# Patient Record
Sex: FEMALE | URBAN - METROPOLITAN AREA
[De-identification: names, ages, dates, MRNs, and addresses within clinical notes are randomized per-mention and may not be internally consistent; named-entity substitution may affect disease eponyms.]

---

## 2021-02-17 ENCOUNTER — OFFICE VISIT (OUTPATIENT)
Dept: URBAN - METROPOLITAN AREA CLINIC 75 | Facility: CLINIC | Age: 76
End: 2021-02-17
Payer: COMMERCIAL

## 2021-02-17 DIAGNOSIS — H52.223 REGULAR ASTIGMATISM, BILATERAL: Primary | ICD-10-CM

## 2021-02-17 DIAGNOSIS — H25.813 COMBINED FORMS OF AGE-RELATED CATARACT, BILATERAL: ICD-10-CM

## 2021-02-17 PROCEDURE — 92014 COMPRE OPH EXAM EST PT 1/>: CPT | Performed by: OPTOMETRIST

## 2021-02-17 ASSESSMENT — INTRAOCULAR PRESSURE
OD: 18
OS: 19

## 2021-02-17 ASSESSMENT — KERATOMETRY
OD: 46.88
OS: 46.88

## 2021-02-17 ASSESSMENT — VISUAL ACUITY
OS: 20/30
OD: 20/40

## 2021-02-17 NOTE — IMPRESSION/PLAN
Impression: Dermatochalasis of left upper eyelid: H02.834.  Plan: Recommend pt see Dr. Soniya Kumar for bleph eval

## 2021-02-17 NOTE — IMPRESSION/PLAN
Impression: Combined forms of age-related cataract, bilateral: H25.813. Plan: Discussed cataracts do not require treatment unless they interfere with vision and impact one's activities of daily living, in which case cataract extraction with lens implant insertion should be performed. Some specific cataracts like posterior subcapsular cataracts occur more commonly in diabetics, patients taking longterm steroid medications, and following trauma. The appropriate time to perform cataract surgery is when the loss of vision is interfering with your activities of daily living and a change in eyeglass prescription won't help. Pt declines any difficulty w/ vision at this time and elects to observe for now. No sx recommended. Discussed signs and symptoms of cataract progression. Pt to contact office if they experience progressive loss of vision, increasing glare, and problems with activities of daily living such as driving, reading, watching TV, seeing street signs, and following the golf ball.

## 2021-02-17 NOTE — IMPRESSION/PLAN
Impression: Dermatochalasis of right upper eyelid: H02.831.  Plan: Recommend pt see Dr. Bart Kinney for bleph eval

## 2021-02-17 NOTE — IMPRESSION/PLAN
Impression: Vitreous degeneration, bilateral: H43.813. Plan: Posterior vitreous detachments are a normal aging change due to the vitreous jelly pulling away from the retinal lining of the eye. They may accompany retinal tears, retinal holes, or retinal detachments, so a dilated retinal examination is important. PVDs will usually diminish with time, but it may take several months and they rarely disappear entirely. Pt to contact office if symptoms worsen, including an increase in number of floaters, you experience flashing lights, loss of vision, or a black curtain blocking your field of vision.

## 2021-03-24 ENCOUNTER — OFFICE VISIT (OUTPATIENT)
Dept: URBAN - METROPOLITAN AREA CLINIC 71 | Facility: CLINIC | Age: 76
End: 2021-03-24
Payer: MEDICARE

## 2021-03-24 DIAGNOSIS — H02.831 DERMATOCHALASIS OF RIGHT UPPER EYELID: Primary | ICD-10-CM

## 2021-03-24 DIAGNOSIS — Z41.1 ENCOUNTER FOR COSMETIC SURGERY: ICD-10-CM

## 2021-03-24 DIAGNOSIS — H02.834 DERMATOCHALASIS OF LEFT UPPER EYELID: ICD-10-CM

## 2021-03-24 PROCEDURE — 99204 OFFICE O/P NEW MOD 45 MIN: CPT | Performed by: OPHTHALMOLOGY

## 2021-03-24 PROCEDURE — 92285 EXTERNAL OCULAR PHOTOGRAPHY: CPT | Performed by: OPHTHALMOLOGY

## 2021-03-24 PROCEDURE — 92081 LIMITED VISUAL FIELD XM: CPT | Performed by: OPHTHALMOLOGY

## 2021-03-24 RX ORDER — NEOMYCIN SULFATE, POLYMYXIN B SULFATE AND DEXAMETHASONE 3.5; 10000; 1 MG/G; [USP'U]/G; MG/G
OINTMENT OPHTHALMIC
Qty: 2 | Refills: 1 | Status: INACTIVE
Start: 2021-03-24 | End: 2021-05-24

## 2021-03-24 NOTE — IMPRESSION/PLAN
Impression: Dermatochalasis of right upper eyelid: H02.831. Plan: Visual Fields & photos were performed and interpreted today and demonstrated restriction of superior and temporal visual fields. This reverted to normal, demonstrating >30% improvement in visual field with mechanical elevation of the eyelid and brow. To correct the loss of superior and temporal VF caused by redundant upper eyelid skin and muscle, I recommended performing an upper lid blepharoplasty. Discussed the R/B/A of this procedure, all questions were answered.

## 2021-03-24 NOTE — IMPRESSION/PLAN
Impression: Dermatochalasis of left upper eyelid: H02.834. Plan: Visual Fields & photos were performed and interpreted today and demonstrated restriction of superior and temporal visual fields. This reverted to normal, demonstrating >30% improvement in visual field with mechanical elevation of the eyelid and brow. To correct the loss of superior and temporal VF caused by redundant upper eyelid skin and muscle, I recommended performing an upper lid blepharoplasty. Discussed the R/B/A of this procedure, all questions were answered.

## 2021-03-24 NOTE — IMPRESSION/PLAN
Impression: Encounter for cosmetic surgery: Z41.1. Plan: pt demonstrates eyebrow ptosis which contributes to upper eyelid hooding and we discussed surgical options including endobrow, direct brow, pretrichial brow lift, and internal brow lifting techniques. After review of these options, we selected cosmetic pretrichial brow lift as the best option given the patient's anatomy, desires, and means. We discussed the r/b/a of this procedure. Discussed risks of temporal nerve branch damage as well as hair loss in the area.

## 2021-03-31 ENCOUNTER — OFFICE VISIT (OUTPATIENT)
Dept: URBAN - METROPOLITAN AREA CLINIC 75 | Facility: CLINIC | Age: 76
End: 2021-03-31

## 2021-03-31 PROCEDURE — 92015 DETERMINE REFRACTIVE STATE: CPT | Performed by: OPTOMETRIST

## 2021-03-31 ASSESSMENT — VISUAL ACUITY
OS: 20/30
OD: 20/30

## 2021-03-31 NOTE — IMPRESSION/PLAN
Impression: Regular astigmatism, bilateral: H52.223. MRx recheck. Axis may be the culprit OD. Plan: A glasses Rx was dispensed today. Pt to call with any concerns.

## 2021-04-28 ENCOUNTER — SURGERY (OUTPATIENT)
Dept: URBAN - METROPOLITAN AREA SURGERY 44 | Facility: SURGERY | Age: 76
End: 2021-04-28
Payer: MEDICARE

## 2021-04-28 ENCOUNTER — SURGERY (OUTPATIENT)
Dept: URBAN - METROPOLITAN AREA SURGERY 45 | Facility: SURGERY | Age: 76
End: 2021-04-28
Payer: COMMERCIAL

## 2021-05-07 ENCOUNTER — POST-OPERATIVE VISIT (OUTPATIENT)
Dept: URBAN - METROPOLITAN AREA CLINIC 72 | Facility: CLINIC | Age: 76
End: 2021-05-07
Payer: MEDICARE

## 2021-05-07 DIAGNOSIS — Z48.89 ENCOUNTER FOR OTHER SPECIFIED SURGICAL AFTERCARE: Primary | ICD-10-CM

## 2021-05-07 PROCEDURE — 99024 POSTOP FOLLOW-UP VISIT: CPT | Performed by: OPTOMETRIST

## 2021-05-07 ASSESSMENT — INTRAOCULAR PRESSURE
OD: 13
OS: 14

## 2021-05-07 NOTE — IMPRESSION/PLAN
Impression: S/P Both Upper Eyelid Blepharoplasty OU - 9 Days. Encounter for other specified surgical aftercare  Z48.89. Plan: Sutures removed in clinic today, without complications. Pt can D/c aga or use for itching/comfort --Advised patient to use artificial tears for comfort.

## 2021-05-24 ENCOUNTER — OFFICE VISIT (OUTPATIENT)
Dept: URBAN - METROPOLITAN AREA CLINIC 75 | Facility: CLINIC | Age: 76
End: 2021-05-24
Payer: MEDICARE

## 2021-05-24 DIAGNOSIS — H43.813 VITREOUS DEGENERATION, BILATERAL: ICD-10-CM

## 2021-05-24 PROCEDURE — 99214 OFFICE O/P EST MOD 30 MIN: CPT | Performed by: OPTOMETRIST

## 2021-05-24 PROCEDURE — 92134 CPTRZ OPH DX IMG PST SGM RTA: CPT | Performed by: OPTOMETRIST

## 2021-05-24 ASSESSMENT — KERATOMETRY
OD: 47.50
OS: 47.25

## 2021-05-24 ASSESSMENT — INTRAOCULAR PRESSURE
OS: 14
OD: 13

## 2021-11-23 ENCOUNTER — OFFICE VISIT (OUTPATIENT)
Dept: URBAN - METROPOLITAN AREA CLINIC 75 | Facility: CLINIC | Age: 76
End: 2021-11-23
Payer: MEDICARE

## 2021-11-23 DIAGNOSIS — H47.10 PAPILLEDEMA: ICD-10-CM

## 2021-11-23 DIAGNOSIS — H04.123 DRY EYE SYNDROME OF BILATERAL LACRIMAL GLANDS: ICD-10-CM

## 2021-11-23 PROCEDURE — 92134 CPTRZ OPH DX IMG PST SGM RTA: CPT | Performed by: OPTOMETRIST

## 2021-11-23 PROCEDURE — 92133 CPTRZD OPH DX IMG PST SGM ON: CPT | Performed by: OPTOMETRIST

## 2021-11-23 PROCEDURE — 99214 OFFICE O/P EST MOD 30 MIN: CPT | Performed by: OPTOMETRIST

## 2021-11-23 ASSESSMENT — INTRAOCULAR PRESSURE
OD: 17
OS: 17

## 2021-11-23 NOTE — IMPRESSION/PLAN
Impression: Vitreous degeneration, bilateral: H43.813. OCT ordered and performed 11/23/21 revealing PVD with no signs of ERM at this time. Also known as posterior vitreous detachments are a normal aging change due to the vitreous jelly pulling away from the retinal lining of the eye. They may accompany retinal tears, retinal holes, or retinal detachments, so a dilated retinal examination is important. PVDs will usually diminish with time, but it may take several months and they rarely disappear entirely. Plan: Discussed. Pt to contact office if symptoms worsen, including an increase in number of floaters, you experience flashing lights, loss of vision, or a black curtain blocking your field of vision.

## 2021-11-23 NOTE — IMPRESSION/PLAN
Impression: Papilledema: H47. 10. Due to DX of Sleep Apnea. Papilledema is a condition in which increased pressure in or around the brain causes the part of the optic nerve inside the eye to swell. Symptoms may be fleeting disturbances in vision, headache, vomiting, or a combination. Plan: Discussed. Patient recommended to continue use of sleep machine. Observe.

## 2021-11-23 NOTE — IMPRESSION/PLAN
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 Dry eye that occurs when tears aren't able to provide adequate moisture. The eye may become dry, red, and inflamed. The main symptoms are discomfort and sensitivity to light. Prescription and lubricating eye drops can reduce dryness. Plan: Discussed dry eye syndrome requires lubrication of the eye with artificial tears and nighttime ointments or gels. In addition, topical and oral antiinflammatory medications are usually necessary to treat the associated ocular irritation. Use your eyedrops and lubricating ointments on a regular basis, and don't wait until your eyes get dry or start burning before using them. Pt recommended blink exercises to assist.
 
Pt to contact office if dry eye syndrome does not improve or worsens despite treatment or if vision gets blurry.

## 2022-03-04 ENCOUNTER — OFFICE VISIT (OUTPATIENT)
Dept: URBAN - METROPOLITAN AREA CLINIC 71 | Facility: CLINIC | Age: 77
End: 2022-03-04
Payer: MEDICARE

## 2022-03-04 DIAGNOSIS — S05.12XA CONTUSION OF EYEBALL AND ORBITAL TISSUES, LEFT EYE, INIT: Primary | ICD-10-CM

## 2022-03-04 PROCEDURE — 99213 OFFICE O/P EST LOW 20 MIN: CPT | Performed by: OPHTHALMOLOGY

## 2022-03-04 ASSESSMENT — INTRAOCULAR PRESSURE
OD: 11
OS: 14

## 2022-03-04 NOTE — IMPRESSION/PLAN
Impression: Contusion of eyeball and orbital tissues, left eye, init: S05.12xA. Plan: Xray done- no fractures or broken orbit. Discussed the floor fracture, will continue to monitor.

## 2022-06-08 ENCOUNTER — OFFICE VISIT (OUTPATIENT)
Dept: URBAN - METROPOLITAN AREA CLINIC 75 | Facility: CLINIC | Age: 77
End: 2022-06-08
Payer: COMMERCIAL

## 2022-06-08 DIAGNOSIS — H52.223 REGULAR ASTIGMATISM, BILATERAL: Primary | ICD-10-CM

## 2022-06-08 PROCEDURE — 92014 COMPRE OPH EXAM EST PT 1/>: CPT | Performed by: OPTOMETRIST

## 2022-06-08 ASSESSMENT — INTRAOCULAR PRESSURE
OS: 14
OD: 13

## 2022-06-08 ASSESSMENT — VISUAL ACUITY
OS: 20/20
OD: 20/30

## 2022-06-08 NOTE — IMPRESSION/PLAN
Impression: Regular astigmatism, bilateral: H52.223. Plan: New glasses Rx was generated today. Pt to contact office with any questions or concerns. PRN Return 1yr Full Vision

## 2023-06-05 ENCOUNTER — OFFICE VISIT (OUTPATIENT)
Dept: URBAN - METROPOLITAN AREA CLINIC 75 | Facility: CLINIC | Age: 78
End: 2023-06-05
Payer: COMMERCIAL

## 2023-06-05 DIAGNOSIS — H52.223 REGULAR ASTIGMATISM, BILATERAL: Primary | ICD-10-CM

## 2023-06-05 PROCEDURE — 92014 COMPRE OPH EXAM EST PT 1/>: CPT | Performed by: OPTOMETRIST

## 2023-06-05 ASSESSMENT — INTRAOCULAR PRESSURE
OD: 18
OS: 14

## 2023-06-05 ASSESSMENT — VISUAL ACUITY
OS: 20/30
OD: 20/30

## 2023-08-31 ENCOUNTER — OFFICE VISIT (OUTPATIENT)
Dept: URBAN - METROPOLITAN AREA CLINIC 71 | Facility: CLINIC | Age: 78
End: 2023-08-31
Payer: MEDICARE

## 2023-08-31 DIAGNOSIS — S05.12XA CONTUSION OF EYEBALL AND ORBITAL TISSUES, LEFT EYE, INIT: Primary | ICD-10-CM

## 2023-08-31 DIAGNOSIS — H43.813 VITREOUS DEGENERATION, BILATERAL: ICD-10-CM

## 2023-08-31 PROCEDURE — 99212 OFFICE O/P EST SF 10 MIN: CPT | Performed by: OPHTHALMOLOGY

## 2023-08-31 ASSESSMENT — INTRAOCULAR PRESSURE
OD: 15
OS: 14

## 2024-08-08 ENCOUNTER — TECH ONLY (OUTPATIENT)
Dept: URBAN - METROPOLITAN AREA CLINIC 71 | Facility: CLINIC | Age: 79
End: 2024-08-08
Payer: MEDICARE

## 2024-08-08 DIAGNOSIS — H25.813 COMBINED FORMS OF AGE-RELATED CATARACT, BILATERAL: Primary | ICD-10-CM

## 2024-11-12 ENCOUNTER — OFFICE VISIT (OUTPATIENT)
Dept: URBAN - METROPOLITAN AREA CLINIC 71 | Facility: CLINIC | Age: 79
End: 2024-11-12
Payer: MEDICARE

## 2024-11-12 DIAGNOSIS — H57.813 BROW PTOSIS, BILATERAL: ICD-10-CM

## 2024-11-12 DIAGNOSIS — H25.813 COMBINED FORMS OF AGE-RELATED CATARACT, BILATERAL: Primary | ICD-10-CM

## 2024-11-12 DIAGNOSIS — H43.813 VITREOUS DEGENERATION, BILATERAL: ICD-10-CM

## 2024-11-12 PROCEDURE — 99214 OFFICE O/P EST MOD 30 MIN: CPT | Performed by: OPHTHALMOLOGY

## 2024-11-12 PROCEDURE — 92134 CPTRZ OPH DX IMG PST SGM RTA: CPT | Performed by: OPHTHALMOLOGY

## 2024-11-12 RX ORDER — KETOROLAC TROMETHAMINE 5 MG/ML
0.5 % SOLUTION OPHTHALMIC
Qty: 5 | Refills: 1 | Status: ACTIVE
Start: 2024-11-12

## 2024-11-12 ASSESSMENT — INTRAOCULAR PRESSURE
OD: 17
OS: 19

## 2024-11-12 ASSESSMENT — VISUAL ACUITY
OD: 20/25-2
OS: 20/30

## 2024-11-18 ENCOUNTER — SURGERY (OUTPATIENT)
Dept: URBAN - METROPOLITAN AREA SURGERY 45 | Facility: SURGERY | Age: 79
End: 2024-11-18
Payer: MEDICARE

## 2024-11-18 ENCOUNTER — SURGERY (OUTPATIENT)
Dept: URBAN - METROPOLITAN AREA SURGERY 44 | Facility: SURGERY | Age: 79
End: 2024-11-18
Payer: MEDICARE

## 2024-11-18 PROCEDURE — CRBAL CREDIT BALANCE: CUSTOM | Performed by: OPHTHALMOLOGY

## 2024-11-18 PROCEDURE — 66984 XCAPSL CTRC RMVL W/O ECP: CPT | Performed by: OPHTHALMOLOGY

## 2024-11-19 ENCOUNTER — POST-OPERATIVE VISIT (OUTPATIENT)
Dept: URBAN - METROPOLITAN AREA CLINIC 71 | Facility: CLINIC | Age: 79
End: 2024-11-19
Payer: MEDICARE

## 2024-11-19 DIAGNOSIS — Z48.810 ENCOUNTER FOR SURGICAL AFTERCARE FOLLOWING SURGERY ON A SENSE ORGAN: Primary | ICD-10-CM

## 2024-11-19 PROCEDURE — 99024 POSTOP FOLLOW-UP VISIT: CPT

## 2024-11-19 ASSESSMENT — INTRAOCULAR PRESSURE
OS: 17
OD: 14

## 2024-11-25 ENCOUNTER — POST-OPERATIVE VISIT (OUTPATIENT)
Dept: URBAN - METROPOLITAN AREA CLINIC 71 | Facility: CLINIC | Age: 79
End: 2024-11-25
Payer: MEDICARE

## 2024-11-25 DIAGNOSIS — Z48.810 ENCOUNTER FOR SURGICAL AFTERCARE FOLLOWING SURGERY ON A SENSE ORGAN: Primary | ICD-10-CM

## 2024-11-25 PROCEDURE — 99024 POSTOP FOLLOW-UP VISIT: CPT

## 2024-11-25 ASSESSMENT — INTRAOCULAR PRESSURE
OS: 15
OD: 13

## 2024-12-09 ENCOUNTER — SURGERY (OUTPATIENT)
Dept: URBAN - METROPOLITAN AREA SURGERY 45 | Facility: SURGERY | Age: 79
End: 2024-12-09
Payer: MEDICARE

## 2024-12-09 ENCOUNTER — SURGERY (OUTPATIENT)
Dept: URBAN - METROPOLITAN AREA SURGERY 44 | Facility: SURGERY | Age: 79
End: 2024-12-09
Payer: MEDICARE

## 2024-12-09 PROCEDURE — CRBAL CREDIT BALANCE: CUSTOM | Performed by: OPHTHALMOLOGY

## 2024-12-10 ENCOUNTER — POST-OPERATIVE VISIT (OUTPATIENT)
Dept: URBAN - METROPOLITAN AREA CLINIC 71 | Facility: CLINIC | Age: 79
End: 2024-12-10
Payer: MEDICARE

## 2024-12-10 DIAGNOSIS — Z96.1 PRESENCE OF INTRAOCULAR LENS: Primary | ICD-10-CM

## 2024-12-10 PROCEDURE — 99024 POSTOP FOLLOW-UP VISIT: CPT

## 2024-12-10 ASSESSMENT — INTRAOCULAR PRESSURE
OS: 19
OD: 12

## 2025-04-03 ENCOUNTER — OFFICE VISIT (OUTPATIENT)
Dept: URBAN - METROPOLITAN AREA CLINIC 71 | Facility: CLINIC | Age: 80
End: 2025-04-03
Payer: COMMERCIAL

## 2025-04-03 DIAGNOSIS — H04.332 ACUTE LACRIMAL CANALICULITIS OF LEFT LACRIMAL PASSAGE: Primary | ICD-10-CM

## 2025-04-03 DIAGNOSIS — H26.492 OTHER SECONDARY CATARACT, LEFT EYE: ICD-10-CM

## 2025-04-03 PROCEDURE — 99213 OFFICE O/P EST LOW 20 MIN: CPT

## 2025-04-03 RX ORDER — AZITHROMYCIN MONOHYDRATE 250 MG/1
250 MG TABLET, FILM COATED ORAL
Qty: 12 | Refills: 0 | Status: INACTIVE
Start: 2025-04-03 | End: 2025-04-07

## 2025-04-03 RX ORDER — OFLOXACIN 3 MG/ML
0.3 % SOLUTION/ DROPS OPHTHALMIC
Qty: 5 | Refills: 0 | Status: ACTIVE
Start: 2025-04-03

## 2025-04-03 ASSESSMENT — INTRAOCULAR PRESSURE
OD: 9
OS: 11

## 2025-04-17 ENCOUNTER — OFFICE VISIT (OUTPATIENT)
Dept: URBAN - METROPOLITAN AREA CLINIC 71 | Facility: CLINIC | Age: 80
End: 2025-04-17
Payer: MEDICARE

## 2025-04-17 DIAGNOSIS — H43.813 VITREOUS DEGENERATION, BILATERAL: ICD-10-CM

## 2025-04-17 DIAGNOSIS — H04.332 ACUTE LACRIMAL CANALICULITIS OF LEFT LACRIMAL PASSAGE: ICD-10-CM

## 2025-04-17 DIAGNOSIS — H26.492 OTHER SECONDARY CATARACT, LEFT EYE: Primary | ICD-10-CM

## 2025-04-17 PROCEDURE — 99214 OFFICE O/P EST MOD 30 MIN: CPT

## 2025-04-17 PROCEDURE — 92134 CPTRZ OPH DX IMG PST SGM RTA: CPT

## 2025-04-17 PROCEDURE — 92133 CPTRZD OPH DX IMG PST SGM ON: CPT

## 2025-04-17 ASSESSMENT — INTRAOCULAR PRESSURE
OS: 14
OD: 13